# Patient Record
Sex: FEMALE | URBAN - METROPOLITAN AREA
[De-identification: names, ages, dates, MRNs, and addresses within clinical notes are randomized per-mention and may not be internally consistent; named-entity substitution may affect disease eponyms.]

---

## 2017-02-07 ENCOUNTER — IMPORTED ENCOUNTER (OUTPATIENT)
Dept: URBAN - METROPOLITAN AREA CLINIC 1 | Facility: CLINIC | Age: 52
End: 2017-02-07

## 2017-02-07 PROBLEM — H52.4: Noted: 2017-02-07

## 2017-02-07 PROCEDURE — S0621 ROUTINE OPHTHALMOLOGICAL EXA: HCPCS

## 2018-01-09 ENCOUNTER — IMPORTED ENCOUNTER (OUTPATIENT)
Dept: URBAN - METROPOLITAN AREA CLINIC 1 | Facility: CLINIC | Age: 53
End: 2018-01-09

## 2018-01-09 PROBLEM — H01.004: Noted: 2018-01-09

## 2018-01-09 PROBLEM — H04.123: Noted: 2018-01-09

## 2018-01-09 PROBLEM — H10.45: Noted: 2018-01-09

## 2018-01-09 PROBLEM — H01.001: Noted: 2018-01-09

## 2018-01-09 PROCEDURE — 92012 INTRM OPH EXAM EST PATIENT: CPT

## 2018-06-12 ENCOUNTER — HOSPITAL ENCOUNTER (OUTPATIENT)
Dept: PHYSICAL THERAPY | Age: 53
Discharge: HOME | End: 2018-06-12
Payer: COMMERCIAL

## 2018-06-12 PROCEDURE — 92507 TX SP LANG VOICE COMM INDIV: CPT

## 2018-06-12 PROCEDURE — 92524 BEHAVRAL QUALIT ANALYS VOICE: CPT

## 2018-06-12 NOTE — PROGRESS NOTES
ST DAILY TREATMENT NOTE/VOICE EVALUATION    Patient Name: Reena Aceves  Date:2018  : 1965  [x]  Patient  Verified  Payor: BLUE CROSS / Plan: Tony Prince [id number] PPO / Product Type: PPO /   In time:11:35  Out time:12:30  Total Treatment Time (min): 54  Visit #: 1 of 24    SUBJECTIVE  Pain Level (0-10 scale): 0  Any medication changes, allergies to medications, adverse drug reactions, diagnosis change, or new procedure performed?: [x] No    [] Yes (see summary sheet for update)  Subjective functional status/changes:   [] No changes reported  Pleasant 49 y/o female presents to O/P  for a voice evaluation d/t chronic vocal hoarseness s/p meningioma surgery ([address] [place] [address]) in 2013. She also has allergies, GERD/LPR, and hypothyroidism. She has a vocal demanding job and wishes to seek treatment from ST to improve voice and reduce vocal misuses. Date of Onset:  s/p intubation for meningioma surgery  Social History:  Pt has a vocally demanding job. She enjoys spending time with her partner, granddaughter, and experimenting with essential oils. Prior Functional level: Pt reports vocal quality, loudness, stamina all WNLs prior to 's surgery. No other c/o of speech/language or dysphagia deficits. OBJECTIVE    OUTPATIENT VOICE EVALUATION    Description of Problem: Vocal hoarseness with apparent erythema and edema; poor right vocal cord mobility    Onset of Problem: Pt reports s/p meningioma surgery in 2013 her vocal quality became hoarse. Per ENT notes: pt has poor mobility of the right vocal cord.     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    Know Abuse/Misuse: frequent throat clearing and coughing    Pitch:   [] WNL  [] Low  [] High     Comments: restricted  Loudness: [] WNL  [] Excessive [x] Inadequate     Comments:  Quality:  [] WNL      Comments: moderately Hoarse, strained, breathy    Resonance: [x] WNL  [] Hypernasal [] Hyponasal Comments:  Rate:  [x] WNL  [] Fast  [] Slow     Comments:  Range:  [] WNL  [] Montone [] Excessive variability    Comments: restrictedno low register  Observations [] Pitch Breaks [] Glottal Bloom [] Stridency [] Hard Glottal Attacks    [x] Aphonia [] Diplophonia [x] Phonation Breaks     [] Severe Fluctuations on Quality    []Other:     [x] Aphonia interspersed with intermittent phonation      Vowel prolongation /a/ (a measure of respiratory/phonatory efficiency):  duration:  12.2 seconds; Pitch: 207  Hz, Intensity:  62 dB   Vocal quality:  Hoarse, strained, breathy    Excessive tension observed in the neck, chest, mandible during the prolongation    Frequency (as measured by chromatic tuner and keyboard):  Pitch Range: 15   Semi-tones         Lowest: 196 Hz   Highest: 466 Hz   Modal pitch in conversation: 207 Hz  Modal pitch in readinHz    Is breathing audible? [] Yes [x] No  Is phonation on inhalation? [] Yes [x] No  Is breathing pattern irregular? [] Yes [x] No  Does patient have difficulty sustaining expiration? [x] Yes [] No  Does patient focus on breathing? [] Yes [x] No  Clavicular/ thoracic breathing? [x] Yes [] No  Reduced respiratory/speech coordination? [x] Yes [] No    Frequent coughing? [x] Yes [] No   Frequent throat clearing? [x] Yes [] No   Extrinsic laryngeal tension? [x] Yes [] No  Visible tension present: [x]neck  [x] chest  [x] mandible  Evidence of tight throat during phonation? [] Yes [x] No  Complaints of tired throat?  [x] Yes [] No  Tone focus: []retracted tongue  [] closed mouth   []appropriate [x] back throat focus    Perceptual assessment:  Consensus Auditory Perceptual Evaluation of Voice (CAPE-V) was administered by this SLP :On a scale of 0 to 100 with 100 equaling the most extreme deviance she attained the following scores: (see attached in paper chart):  Overall Severity: 35/100 consistent; moderately deviant, roughness: 34/100 consistent moderately deviant , breathiness 32/100 consistent moderately deviant; strain: 36/100 consistent, moderately deviant; pitch (restricted) 29/100 consistent; mild- moderately deviant; loudness: (too soft)  20/100 consistent; mild-moderately deviant      Patient completed Voice Handicap Index (VHI): (see attached in her paper chart): She score agreement or disagreement for statements that many people have used to describe their voices and the effects of their voices on their lives. It is self scored with score choices 0 through 4. Zero indicates 'never handicapping' and 4 indicating 'always handicapping'. The higher the score the more handicapping. There are 40 points possible for each of the 3 sections and also a total handicapping score of 120 points. For functional she attained a score of 33 points (severely handicapping); for physical she scored 34 points (severely handicapping); and for emotional she scored 24 points (severely handicapping). She attained a total score of 91 points (severely handicapping). She rated herself a score of 7  on a 7 point scale (extremely talkative). She completed LPR Symptoms Index: See attached in chart. (0= no problem 5=severe problem) She rated the following as 5:  sensation of something sticking in her throat or lump in her throat. She rated the following as 4:hoarseness or problem with her voice; clearing her throat; coughing after eating or after lying down; troublesome or annoying cough. She rated the following as 3: difficulty swallowing; breathing difficulty or choking episodes; excess mucus or postnasal drip,  She rated the following as 0: heartburn, chest pain, indigestion, or stomach acid coming up.       Speech:   Oral Verbal Apraxia: [x] None     [] Mild [] Moderate [] Severe   Dysarthria:  [x] None     [] Mild [] Moderate [] Severe   Intelligibility  [x] WNL      [] Words [] Phrases [] Sentences       [] Conversation  % intelligible: 100   Agrammatic:  [] Yes       [x] No  Hearing screened by: Passed [] Yes [x] No  Comments:  Pt reports significant hearing loss to Right ear d/t resection of 4 cm acoustic neuroma   Fluency:  [x]WNL  []Dysfluent   Comments:    Deviations noted:  [x]none  []yes, describe: Auditory Comprehension: [x] WFL [] Impaired - describe: (subjective)  Verbal Expression: [x] WFL [] Impaired - describe: (subjective)  Reading comprehension: [x] WFL [] Impaired - describe: (subjective)  Cognitive skills: [x] WFL [] Impaired - describe: (subjective)      Patient/Caregiver instruction/education: [] Review HEP    [] Progressed/Changed    HEP/Handouts given: Eliminating vocal misuses; LPR literature: s/s, trigger foods, vocal hygiene guidelines, and How to take PPI. Pain Level (0-10 scale) post treatment: 0    ASSESSMENT  [x]  See Plan of Care    PLAN  []  Upgrade activities as tolerated     [x]  Continue plan of care  []  Discharge due to:__  [x] Other: Tx was initiated: Diagnosis, prognosis, short and long term goals were developed with patient participation and approval. Correct way to take your reflux medication for better absorption via handout and confirmation from her pharmacist d/t levothyroxine. Additionally, pt was educated re: reducing vocal misuses and provided a handout.        Katerin Lake, SLP 6/12/2018  12:05 PM  MA, [id number] [facility]  Speech-Language Pathologist

## 2018-06-12 NOTE — PROGRESS NOTES
[facility]  [employer] [facility]  [address] [place] [address]  [phone] fax    Plan of Care/ Statement of Necessity for Speech Therapy Services    Patient name: Noe Riley Start of Care: 2018   Referral source: Chad Hendrickson MD : 1965    Medical Diagnosis: Acoustic neuroma syndrome [facility]) [D33.3]   Onset Date: 2013   Treatment Diagnosis: R 49 dysphonia   Prior Hospitalization: see medical history Provider#: [id number]   Medications: Verified on Patient summary List    Comorbidities: GERD/LPR; allergies; hypothyroidism;hyperlipidemia;anemia; s/p right petroclavial meningioma    Prior Level of Function: Pt reports vocal quality, loudness, stamina all WNLs prior to 's surgery. No other c/o of speech/language or dysphagia deficits. The Plan of Care and following information is based on the information from the initial evaluation. Assessment/ key information: This pleasant 47 y/o female presents to [facility] for a voice evaluation d/t chronic vocal hoarseness s/p meningioma surgery ([address] [place] [address]) in 2013. She c/o of a intense \"burning sensation\" in which she denies is reflux; however  has evidence of LPR/GERD as well. SLP is unsure if this burning sensation could be a possible glossopharyngeal neuralgia from her surgery. She also has allergies,and hypothyroidism. She has a vocal demanding job and wishes to seek treatment from  to improve voice and reduce vocal misuses. Pt is a non smoker and reports to consume alcohol occasionally. She reports consuming apple cider vinegar and citrus fruits regularly. A videostroboscopy indicated poor mobility of the right vocal fold, and presence of erythema and edema, indicating vocal tension and pushing of voice. Pt reports she has not been taking her PPI, as she does not like to take medications, and she was unsure how to take it with her levothyroxine.         A voice evaluation was completed today along with teaching. Patient exhibited a moderate muscle tension dysphonia c/b a moderately hoarse vocal quality in conversation and for all vocal tasks and excess vocal tension and visible tension of her neck, mandible, and face at times during assorted vocal tasks. She spoke with a back throat focus and exhibited reduce respiratory/ speech coordination and  thoracic breathing with all phonatory tasks and in conversation. She strained to produce a soft voice at 62-66 dB. She excessively cleared her throat during today's evaluation. Her modal pitch was WNLs in conversation at [facility] for her age and gender; however her pitch range was extremely restricted at 15 semi-tones. Her basal was 196 Hz and her ceiling was 466 Hz with excessive strain. Vowel prolongation /a/ (a measure of respiratory/phonatory efficiency):  duration:  12.2 seconds; Pitch: 207  Hz, Intensity:  62 dB   Vocal quality:  Hoarse, strained, breathy    Excessive tension observed in the neck, chest, mandible during the prolongation. Perceptual assessment:  Consensus Auditory Perceptual Evaluation of Voice (CAPE-V) was administered by this SLP :On a scale of 0 to 100 with 100 equaling the most extreme deviance she attained the following scores: (see attached in paper chart):  Overall Severity: 35/100 consistent; moderately deviant, roughness: 34/100 consistent moderately deviant , breathiness 32/100 consistent moderately deviant; strain: 36/100 consistent, moderately deviant; pitch (restricted) 29/100 consistent; mild- moderately deviant; loudness: (too soft)  20/100 consistent; mild-moderately deviant        Patient completed Voice Handicap Index (VHI): (see attached in her paper chart): She score agreement or disagreement for statements that many people have used to describe their voices and the effects of their voices on their lives. It is self scored with score choices 0 through 4.  Zero indicates 'never handicapping' and 4 indicating 'always handicapping'. The higher the score the more handicapping. There are 40 points possible for each of the 3 sections and also a total handicapping score of 120 points. For functional she attained a score of 33 points (severely handicapping); for physical she scored 34 points (severely handicapping); and for emotional she scored 24 points (severely handicapping). She attained a total score of 91 points (severely handicapping). She rated herself a score of 7  on a 7 point scale (extremely talkative). She completed LPR Symptoms Index: See attached in chart. (0= no problem 5=severe problem) She rated the following as 5:  sensation of something sticking in her throat or lump in her throat. She rated the following as 4:hoarseness or problem with her voice; clearing her throat; coughing after eating or after lying down; troublesome or annoying cough. She rated the following as 3: difficulty swallowing; breathing difficulty or choking episodes; excess mucus or postnasal drip,  She rated the following as 0: heartburn, chest pain, indigestion, or stomach acid coming up. Her S:Z ratio was borderline abnormal at 1.4, but improved to . 91 when SLP instructed her to turn her head to compensate for vocal fold weakness, which was WNLS. A teaching session was provided regarding how to take her PPI correctly for improved absorption and compliance with behavioral diet recommendations. D/t recent thyroid levels, pt was very hesitant taking her PPI with Levothyroxine, as she stated she really doesn't like taking medications. SLP reassured her the purpose and taking it correctly would hopefully seek benefits to her voice and reduce discomfort. SLP also phoned pt's pharmacist to review time period between medications for appropriate absorption to reduce risk of iteract, as they both need to be taken in isolation.   Teaching regarding the effects of frequent throat clearing was conducted and with instructions to try to inhabit the behavior using a hard swallow or sip of water or both instead. She does have allergies, GERD, and post nasal drip, but frequent throat clearing with her appears to be habitual and may very well be related to feeling of  excessive muscle strain. She was given a chart to plot instances of coughing and throat clearing daily. Diagnosis, prognosis, short and long term goals were developed with patient participation and approved. It is recommended that she receive skilled speech therapy services as it is medically necessary to improve her voice for vocal ADL tasks related to home and community, and for her QOL. Problem List:      []aphasic  []dysarthric  []dysphagic       []alexic  []agraphic  [x]dysphonia       []dysfluency   []Cognitive-Linguistic Disorder       []other   Treatment Plan may include any combination of the following: Voice Treatment and Patient Education      Patient / Family readiness to learn indicated by: asking questions, trying to perform skills and interest    Persons(s) to be included in education:   patient (P) and family support person (FSP);list: [facility])    Barriers to Learning/Limitations: yes; Physical- Right vocal fold mobility; reduced sensation (facial)  from surgery     Patient Goal (s):  Relieve from burning sensation in throat. Stop clearing throat, coughing, and hoarseness    Patient Self Reported Health Status: good    Rehabilitation Potential: good    Short Term Goals: To be accomplished in 4-6 weeks  1. Patient will demonstrate good vocal hygiene by  (a) decreasing coughing by ~45% (b) decreasing throat clearing by 50% (c)  taking her PPI correctly per patient reporting for best absorption in order to decrease trauma/irritation to the vocal folds to help improve her voice.   2. Patient will use diaphragmatic/abdominal breath management with good connection of airflow to phonation for vowels words, phrases and sentences with 80% acc with min cues during structured phonation/speaking tasks to improve breath support for speech. 3. Patient will perform laryngeal area relaxation and stretching exercises with min cues and consistent with HEP. 4. Patient will demonstrate a more forward placement of tone, easy onsets of phonation and a legato speaking style with increase in loudness, decrease tension and improved pitch (range)  for vowels, words, and phrases and short sentences with 75% accuracy with min-mod cues in order to improve her voice. 5.  Patient will perform vocal fx exercises (such as Stemple's, vocal entrainment-pitch ex's) with mod A in order to improve the balance between respiration, phonation and resonance and improve vocal strength and flexibility for improved vocal health and voice production. 6. Patient will demonstrate an improved voice utilizing techniques developed in dx voice therapy (Progresive Muscle Relaxation, semi-occluded vocal tract exercises, and Cup Bubble/Lax Vox) to improve phonation  and decreased tension in order to produce sentences without breath holding in 60% of opportunities when provided with min-modA. Long Term Goals: To be accomplished in 8-10 weeks   1. Patient will produce improved voice to mild dysphonia to WNL's including decreased tension, increased facial focus, and easier voice and increased vocal stamina with a more appropriate pitch, loudness, and vocal quality 80% of the time for vocal ADL/IADL tasks as measured by objective measurements, SLP assessment and SLP/patient agreement for perceptual judgment. Frequency / Duration: Patient to be seen 1-2 times per week for 12 weeks:    Patient/ Caregiver education and instruction: Diagnosis, prognosis, development of POC with short and long term goals with patient participation and approval. Correct way to take your reflux medication for better absorption via handout and confirmation from her pharmacist d/t levothyroxine was provided.  Additionally, pt was educated re: reducing vocal misuses and provided a handout. Esperanza Smith SLP 6/12/2018 7:54 PM  MA, CCC-SLP  Speech-Language Pathologist    ________________________________________________________________________    I certify that the above Therapy Services are being furnished while the patient is under my care. I agree with the treatment plan and certify that this therapy is necessary.     Physician's Signature:____________________  Date:___________Time:_________    Please sign and return to [facility]  [address] [facility]  [phone] fax     Thank you

## 2018-06-13 ENCOUNTER — HOSPITAL ENCOUNTER (OUTPATIENT)
Dept: PHYSICAL THERAPY | Age: 53
Discharge: HOME | End: 2018-06-13
Payer: COMMERCIAL

## 2018-06-13 PROCEDURE — 92507 TX SP LANG VOICE COMM INDIV: CPT

## 2018-06-13 NOTE — PROGRESS NOTES
ST DAILY TREATMENT NOTE    Patient Name: Iram Del Castillo  Date:2018  : 1965  [x]  Patient  Verified  Payor: Payor: BLUE CROSS / Plan: Tony Prince [id number] PPO / Product Type: PPO /   In time: 11:15 Out time: 12:06  Total Treatment Time (min): 51  Visit #: 1 of 24    Treatment Diagnosis: Acoustic neuroma syndrome (HCC) [D33.3]    SUBJECTIVE  Pain Level (0-10 scale): 5-6 throat  Any medication changes, allergies to medications, adverse drug reactions, diagnosis change, or new procedure performed?: [x] No    [] Yes (see summary sheet for update)    Subjective functional status/changes:   [] No changes reported  Pt reports taking her PPI this morning according to handout provided. OBJECTIVE  Treatment provided includes:  Increase/Improve:  [x]  Voice Quality []  Cognitive Linguistic Skills []  Laryngeal/Pharyngeal Exercises   []  Vocal Loudness []  Reading Comprehension []  Swallowing Skills    []  Vocal Cord Function []  Auditory Comprehension []  Oral Motor Skills   [x]  Resonance []  Writing Skills [x]  Compensatory strategies    []  Speech Intelligibility []  Expressive Language []  Attention   [x]  Breath Support/Coord. []  Receptive language []  Memory   []  Articulation []  Safety Awareness [x] Pt education   []  Fluency []  Word Retrieval []        Treatment Provided:  Dx therapy in order to implement resonant voice; vocal hygiene education; diaphragmatic breathing.      Patient/Caregiver  Education: [x] Review HEP      HEP/Handouts given: Diaphragmatic breathing and flow phonation for resonant voice    Pain Level (0-10 scale) post treatment: 5-6 throat    ASSESSMENT   []   Improving appropriately and progressing toward goals  [x]   Improving slowly and progressing toward goals  []   Approximating goals/maximum potential  [x]   Continues to benefit from skilled therapy to address remaining functional deficits  []   Not progressing toward goals and plan of care will be adjusted    Patient will continue to benefit from skilled therapy to address remaining functional deficits breath support/ coordination; vocal quality     Progress towards goals / Updated goals:  1. Patient will demonstrate good vocal hygiene by  (a) decreasing coughing by ~45% (b) decreasing throat clearing by 50% (c)  taking her PPI correctly per patient reporting for best absorption in order to decrease trauma/irritation to the vocal folds to help improve her voice. Current 6/13: PPI this morning accordingly with a meal. Pt still noted frequent  Throat clearing. 2. Patient will use diaphragmatic/abdominal breath management with good connection of airflow to phonation for vowels words, phrases and sentences with 80% acc with min cues during structured phonation/speaking tasks to improve breath support for speech. Current 6/13: Teaching session re: diaphragmatic breathing in supine with automatics 70% acc when provided with modA. 3. Patient will perform laryngeal area relaxation and stretching exercises with min cues and consistent with HEP.   Current 6/13: Not targeted this date. 4. Patient will demonstrate a more forward placement of tone, easy onsets of phonation and a legato speaking style with increase in loudness, decrease tension and improved pitch (range)  for vowels, words, and phrases and short sentences with 75% accuracy with min-mod cues in order to improve her voice. Current 6/13:  Not targeted this date. 5.  Patient will perform vocal fx exercises (such as Stemple's, vocal entrainment-pitch ex's) with mod A in order to improve the balance between respiration, phonation and resonance and improve vocal strength and flexibility for improved vocal health and voice production. Current 6/13: Not targeted this date.   6. Patient will demonstrate an improved voice utilizing techniques developed in dx voice therapy (Progresive Muscle Relaxation, semi-occluded vocal tract exercises, and Cup Bubble/Lax Vox) to improve phonation  and decreased tension in order to produce sentences without breath holding in 60% of opportunities when provided with min-modA. Current 6/13: Teaching session with flow phonation: MaxA, nasal chanting: MaxA;  semi-occluded vocal tract exercises: modA, and Cup Bubble/Lax Vox mod-maxA. Pt reports most noted facial sensations with cup bubble/lax vox; pt may have reduced sensation overall d/t brain surgery. Continue addressing in order to teach forward focus.      PLAN  [x]  Continue plan of care  []  Modify Goals/Treatment Plan      []  Discharge due to:  [] Other:    SEA Snow 6/13/2018  11:25 AM  MA, CCC-SLP  Speech-Language Pathologist    Future Appointments  Date Time Provider Julianna Adina   6/19/2018 5:15 PM [facility]A Snow [facility]

## 2018-08-02 NOTE — PROGRESS NOTES
[facility] Caron Mccormick [address] [facility] 
[phone] fax Speech Therapy Discharge Summary Patient name: Geeta Roberts Start of Care: 18 Referral source: Cinthia Russell MD : [id number] Medical/Treatment Diagnosis: Acoustic neuroma syndrome ([facility] [address] [D33.3] Onset Date: 2013 Prior Hospitalization: see medical history Provider#: [id number] Medications: Verified on Patient Summary List   
Comorbidities: GERD/LPR; allergies; hypothyroidism;hyperlipidemia;anemia; s/p right petroclavial meningioma Prior Level of Function: Pt reports vocal quality, loudness, stamina all WNLs prior to 's surgery. No other c/o of speech/language or dysphagia deficits. Visits from Start of Care: 2    Missed Visits: 2 Reporting Period : 18 to 18 Summary of Care: 
Goal: 1. Patient will demonstrate good vocal hygiene by  (a) decreasing coughing by ~45% (b) decreasing throat clearing by 50% (c)  taking her PPI correctly per patient reporting for best absorption in order to decrease trauma/irritation to the vocal folds to help improve her voice. Status at last note/certification: Goal initiated PPI this morning accordingly with a meal. Pt still noted frequent  Throat clearing. Status at discharge: not met; did not return for ST. Goal: 2. Patient will use diaphragmatic/abdominal breath management with good connection of airflow to phonation for vowels words, phrases and sentences with 80% acc with min cues during structured phonation/speaking tasks to improve breath support for speech. Status at last note/certification: Teaching session re: diaphragmatic breathing in supine with automatics 70% acc when provided with modA. Status at discharge:  not met; did not return for ST.  
 
Goal: 3. Patient will perform laryngeal area relaxation and stretching exercises with min cues and consistent with HEP.  
Status at last note/certification: Goal not targeted, as she only had 1 appointment after eval. 
Status at discharge: not met; did not return for ST. Goal: 4. Patient will demonstrate a more forward placement of tone, easy onsets of phonation and a legato speaking style with increase in loudness, decrease tension and improved pitch (range)  for vowels, words, and phrases and short sentences with 75% accuracy with min-mod cues in order to improve her voice. Status at last note/certification: Goal not targeted, as she only had 1 appointment after eval.  
Status at discharge: not met; did not return for ST. Goal: 5.  Patient will perform vocal fx exercises (such as Stemple's, vocal entrainment-pitch ex's) with mod A in order to improve the balance between respiration, phonation and resonance and improve vocal strength and flexibility for improved vocal health and voice production. Status at last note/certification: Goal not targeted, as she only had 1 appointment after eval. 
Status at discharge: not met; did not return for ST. Goal: 6. Patient will demonstrate an improved voice utilizing techniques developed in dx voice therapy (Progresive Muscle Relaxation, semi-occluded vocal tract exercises, and Cup Bubble/Lax Vox) to improve phonation  and decreased tension in order to produce sentences without breath holding in 60% of opportunities when provided with min-modA. Status at last note/certification: Teaching session with flow phonation: MaxA, nasal chanting: MaxA;  semi-occluded vocal tract exercises: modA, and Cup Bubble/Lax Vox mod-maxA. Pt reports most noted facial sensations with cup bubble/lax vox; pt may have reduced sensation overall d/t brain surgery. Continue addressing in order to teach forward focus Status at discharge: not met; did not return for ST. ASSESSMENT:  
D/C from skilled ST d/t not returning for further services. Eval and x1 f/u appointment completed.  Unable to reach or obtain call backs to schedule tx. RECOMMENDATIONS: 
[x]Discontinue therapy: []Patient has reached or is progressing toward set goals [x]Patient is non-compliant or has abdicated 
    []Due to lack of appreciable progress towards set goals SEA Klein 8/2/2018 3:44 PM 
MA, CCC-SLP Speech-Language Pathologist

## 2022-04-09 ASSESSMENT — TONOMETRY
OD_IOP_MMHG: 16
OS_IOP_MMHG: 15
OD_IOP_MMHG: 15
OS_IOP_MMHG: 15

## 2022-04-09 ASSESSMENT — VISUAL ACUITY
OD_CC: 20/20
OD_CC: J1
OS_CC: 20/20
OS_SC: 20/20
OD_SC: 20/20
OS_CC: J1